# Patient Record
Sex: FEMALE
[De-identification: names, ages, dates, MRNs, and addresses within clinical notes are randomized per-mention and may not be internally consistent; named-entity substitution may affect disease eponyms.]

---
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
## 2025-01-08 NOTE — PROGRESS NOTES
PATIENT PROGRESS NOTE  OTOLOGY/OTOLARYNGOLOGY    REF MD:  No referring provider defined for this encounter.     PCP: Galilea Mcduffie    CHIEF COMPLAINT:    No chief complaint on file.    LAST VISIT 10/07/2024  IMPRESSION:  Right conductive hearing loss - mild, stable with unchanged audiogram  S/P Right tympanoplasty, Right atticotomy, Cartilage graft, OCR (partial) 3/8/2022  S/p Right tympanomastoidectomy, OCR (partial) revision 11/7/2022     PLAN:  -CT Temporal Bones reviewed  -Continue Ciprodex otic drops for 2 more weeks  -Continue water precautions  -May return to normal activity after 1 week  -No flying for 4 weeks post-op  -Follow-up in 2 weeks for gel foam packing removal  ____________________________________________________________________________  Interval history: S/p. Right revision tympanomastoidectomy  2. Right Ossicular chain reconstruction with titanium prosthesis  3. Right conchal cartilage graft   4. Facial nerve monitoring  On 12/17/2024    Here for packing removal. No issues since surgery.     Per Alejandro note:   INTERVAL HX: No issues since surgery. Denies dizziness. Denies change in taste. Hearing feels stable. Denies otorrhea.     HISTORY OF PRESENT ILLNESS: Nahed Ulrich is a 16y female who presents for evaluation of cholesteatoma. Patient is referred by Dr. Naun Dewitt. Patient is accompanied by her mother. Patient had a history of ear infections requiring antibiotics, but did not need ear tubes as a child. In the last year has had drainage from the ear, decreased hearing, and some episodes of imbalance/headache. When she was seen by Dr. Dewitt she was found to have a growth in the right ear which pathology returned as cholesteatoma. After a course of oral antibiotics and ciprodex drops her drainage has stopped and hearing is improved. Patient underwent CT IAC. Has not yet had hearing test. Denies tinnitus. Patient has no other medical conditions.     PAST MEDICAL HISTORY:  No past medical history on  file.    PAST SURGICAL HISTORY:    Surgical history:   Right ear cholesteatoma   S/P Right tympanoplasty, Right atticotomy, Cartilage graft, OCR (partial) 3/8/2022  S/p Right tympanomastoidectomy, OCR (partial) revision 11/7/2022   S/P Right revision tympanomastoidectomy, Right Ossicular chain reconstruction with titanium prosthesis, Right conchal cartilage graft, Facial nerve monitoring 12/17/2024    Current Outpatient Medications on File Prior to Visit   Medication Sig Dispense Refill    HYDROcodone-acetaminophen (NORCO) 5-325 MG Oral Tab Take 1 tablet by mouth every 6 (six) hours as needed for Pain (severe pain). 5 tablet 0    Multiple Vitamins-Minerals (MULTI FOR HER) Oral Tab Take by mouth.       No current facility-administered medications on file prior to visit.       Allergies: No Known Allergies    SOCIAL HISTORY:    Social History     Tobacco Use    Smoking status: Never    Smokeless tobacco: Never   Substance Use Topics    Alcohol use: Never       FAMILY HISTORY: Denies known family history of hearing loss, tinnitus, vertigo, or migraine.  Denies known family history of head and neck cancer, thyroid cancer, bleeding disorders.     REVIEW OF SYSTEMS:   Positives are in bold  Neuro: Headache, facial weakness, facial numbness, neck pain, vertigo  ENT: Hearing change, tinnitus, otorrhea, otalgia, aural fullness, ear pressure, vertigo, imbalance  Sinus pressure, rhinorrhea, congestion, facial pain, jaw pain, dysphagia, odynophagia, sore throat, voice changes, shortness of breath    EXAMINATION:  I washed my hands with an alcohol-based hand gel prior to examination  Constitutional:   --Vitals: /76  Pulse 74  Ht 1.605 m (5' 3.19\")  Wt 67.1 kg (148 lb)  LMP 05/23/2023 (Exact Date)  BMI 26.06 kg/m²   --General: no apparent distress, well-developed, conversant  Psych: affect pleasant and appropriate for age, alert and oriented  Neuro: Facial movement normal bilateral  Respiratory: No stridor, stertor or  increased work of breathing  ENT:  --Ear: (bilateral ears examined under binocular microscopy)  Right ear microscopic exam:  Pinna: Normal, no lesions or masses. Postauricular well healed. A few sutures were extruded, and were removed.   Mastoid: Nontender on palpation.   External auditory canal: Filled with gel foam packing - removed. no evidence of infection  Tympanic membrane: Intact with cartilage graft in place, no tympanic membrane perforation, retraction, or defect present.  Middle ear: Opacified.     Left ear microscopic exam:  Pinna: Normal, no lesions or masses.  Mastoid: Nontender on palpation.   External auditory canal: Clear, no masses or lesions.  Tympanic membrane: Intact, no lesions, normal landmarks.  Middle ear: Aerated.    Audiogram (date 6/13/2022) interpreted and reviewed with the patient today showing:  Puretones: Right: mild CHL Left: normal  SRT: Right: 25 Left: 5  SD: Right: 96 Left: DNT    Audiogram (date 02/16/22) interpreted and reviewed with the patient today showing:  Puretones: Right: within normal range, but there is a ABG Left: normal (better PTA on left than right)  SRT: Right: 25 Left: 5  SD: Right: 96 Left: 100    Audiogram (date 1/9/2023) interpreted and reviewed with the patient today showing:  Puretones: Right: mild CHL to normal to moderate CHL Left: normal  SRT: Right: 25 Left: 5  SD: Right: 100 Left: 96    Surgical pathology 01/27/2022  FINAL DIAGNOSIS   RIGHT EAR DRUM, BIOPSY:   -FRAGMENTS OF KERATINACEOUS DEBRIS CONSISTENT WITH CHOLESTEATOMA     CT TEMPORAL BONES - 10/21/2024  Impression   CONCLUSION:     1. RIGHT temporal bone:  Postoperative changes of tympanomastoidectomy, atticotomy, tympanoplasty, and partial ossicular chain reconstruction for cholesteatoma resection are identified.  Please note that there is no available comparison imaging at this  institution.  Nonspecific 0.9 cm lobulated soft tissue density focus in the epitympanum, which could relate to  postprocedural granulation tissue versus residual/recurrent cholesteatoma.  There is also partial opacification of the mastoidectomy bowl,  which is favored to represent postprocedural granulation tissue.  Correlation any available preoperative imaging would helpful.  If unavailable, consider further evaluation with an internal auditory canal protocol MRI with propeller diffusion to assess  for potential cholesteatoma.  Trace debris or cerumen in the external auditory canal.  Borderline high position of the jugular bulb.     2. LEFT temporal bone:  Trace debris or cerumen in the external auditory canal.  Mastoid and middle ear cavities are well aerated.  No destructive/erosive osseous changes.     3. Mild-to-moderate adenoid enlargement.  4. Lesser incidental findings as above.            ASSESSMENT/PLAN:  Nahed Ulrich is a 19 year old female with   No diagnosis found.       IMPRESSION:  Right middle ear/mastoid cholesteatoma  Right conductive hearing loss - mild  S/P Right tympanoplasty, Right atticotomy, Cartilage graft, OCR (partial) 3/8/2022  S/p Right tympanomastoidectomy, OCR (partial) revision 11/7/2022   S/P Right revision tympanomastoidectomy, Right Ossicular chain reconstruction with titanium prosthesis, Right conchal cartilage graft, Facial nerve monitoring 12/17/2024    PLAN:  -Continue Ciprodex otic drops for 2 more days  -Maintain water precautions for 1 more week  -May return to normal activities after 1 week  -No flying for 1 more week   -Follow-up in 6 weeks for recheck and an audiogram at that time    Situation reviewed with the patient in detail.    Attention: This note has been scribed by Alana Regalado under the supervision of Clifford Lyn MD.     Clifford Lyn MD  Otology/Otolaryngology  [facility]   [address] [facility] [address] [place] [id number]  Phone [phone]  Fax [phone]      I have personally performed the services described in  this documentation. All medical record entries made by the scribe were at my direction and in my presence. I have reviewed the chart and agree that the medical record reflects my personal performance and is accurate and complete.

## 2025-01-09 ENCOUNTER — OFFICE VISIT (OUTPATIENT)
Facility: LOCATION | Age: 20
End: 2025-01-09

## 2025-01-09 VITALS — WEIGHT: 152 LBS | HEIGHT: 63 IN | BODY MASS INDEX: 26.93 KG/M2

## 2025-01-09 DIAGNOSIS — H90.11 CONDUCTIVE HEARING LOSS OF RIGHT EAR WITH UNRESTRICTED HEARING OF LEFT EAR: Primary | ICD-10-CM

## 2025-01-09 DIAGNOSIS — H71.01 CHOLESTEATOMA OF ATTIC OF EAR, RIGHT: ICD-10-CM

## 2025-01-09 PROCEDURE — 99024 POSTOP FOLLOW-UP VISIT: CPT | Performed by: OTOLARYNGOLOGY

## 2025-03-05 NOTE — PROGRESS NOTES
PATIENT PROGRESS NOTE  OTOLOGY/OTOLARYNGOLOGY    REF MD:  No referring provider defined for this encounter.     PCP: Galilea Mcduffie    CHIEF COMPLAINT:    No chief complaint on file.    LAST VISIT 1/09/2025  IMPRESSION:  Right middle ear/mastoid cholesteatoma  Right conductive hearing loss - mild  S/P Right tympanoplasty, Right atticotomy, Cartilage graft, OCR (partial) 3/8/2022  S/p Right tympanomastoidectomy, OCR (partial) revision 11/7/2022   S/P Right revision tympanomastoidectomy, Right Ossicular chain reconstruction with titanium prosthesis, Right conchal cartilage graft, Facial nerve monitoring 12/17/2024    PLAN:  -Continue Ciprodex otic drops for 2 more days  -Maintain water precautions for 1 more week  -May return to normal activities after 1 week  -No flying for 1 more week   -Follow-up in 6 weeks for recheck and an audiogram at that time  ____________________________________________________________________________  Interval history: S/p. Right revision tympanomastoidectomy, Right Ossicular chain reconstruction with titanium prosthesis, Right conchal cartilage graft , Facial nerve monitoring - 12/17/2024. Here for post-op evaluation. Patient is doing well.     Per Alejandro note:   INTERVAL HX: No issues since surgery. Denies dizziness. Denies change in taste. Hearing feels stable. Denies otorrhea.     HISTORY OF PRESENT ILLNESS: Nahed Ulrich is a 16y female who presents for evaluation of cholesteatoma. Patient is referred by Dr. Naun Dewitt. Patient is accompanied by her mother. Patient had a history of ear infections requiring antibiotics, but did not need ear tubes as a child. In the last year has had drainage from the ear, decreased hearing, and some episodes of imbalance/headache. When she was seen by Dr. Dewitt she was found to have a growth in the right ear which pathology returned as cholesteatoma. After a course of oral antibiotics and ciprodex drops her drainage has stopped and hearing is improved.  Patient underwent CT IAC. Has not yet had hearing test. Denies tinnitus. Patient has no other medical conditions.     PAST MEDICAL HISTORY:  No past medical history on file.    PAST SURGICAL HISTORY:    Surgical history:   Right ear cholesteatoma   S/P Right tympanoplasty, Right atticotomy, Cartilage graft, OCR (partial) 3/8/2022  S/p Right tympanomastoidectomy, OCR (partial) revision 11/7/2022   S/P Right revision tympanomastoidectomy, Right Ossicular chain reconstruction with titanium prosthesis, Right conchal cartilage graft, Facial nerve monitoring 12/17/2024    Current Outpatient Medications on File Prior to Visit   Medication Sig Dispense Refill    HYDROcodone-acetaminophen (NORCO) 5-325 MG Oral Tab Take 1 tablet by mouth every 6 (six) hours as needed for Pain (severe pain). (Patient not taking: Reported on 1/9/2025) 5 tablet 0    Multiple Vitamins-Minerals (MULTI FOR HER) Oral Tab Take by mouth.       No current facility-administered medications on file prior to visit.       Allergies: No Known Allergies    SOCIAL HISTORY:    Social History     Tobacco Use    Smoking status: Never    Smokeless tobacco: Never   Substance Use Topics    Alcohol use: Never       FAMILY HISTORY: Denies known family history of hearing loss, tinnitus, vertigo, or migraine.  Denies known family history of head and neck cancer, thyroid cancer, bleeding disorders.     REVIEW OF SYSTEMS:   Positives are in bold  Neuro: Headache, facial weakness, facial numbness, neck pain, vertigo  ENT: Hearing change, tinnitus, otorrhea, otalgia, aural fullness, ear pressure, vertigo, imbalance  Sinus pressure, rhinorrhea, congestion, facial pain, jaw pain, dysphagia, odynophagia, sore throat, voice changes, shortness of breath    EXAMINATION:  I washed my hands with an alcohol-based hand gel prior to examination  Constitutional:   --Vitals: /76  Pulse 74  Ht 1.605 m (5' 3.19\")  Wt 67.1 kg (148 lb)  LMP 05/23/2023 (Exact Date)  BMI 26.06  kg/m²   --General: no apparent distress, well-developed, conversant  Psych: affect pleasant and appropriate for age, alert and oriented  Neuro: Facial movement normal bilateral  Respiratory: No stridor, stertor or increased work of breathing  ENT:  --Ear: (bilateral ears examined under binocular microscopy)  Right ear microscopic exam:  Pinna: Normal, no lesions or masses. Postauricular well healed. A few sutures were extruded, and were removed.   Mastoid: Nontender on palpation.   External auditory canal: no evidence of infection  Tympanic membrane: Intact with cartilage graft in place, no tympanic membrane perforation, retraction, or defect present.  Middle ear: Aerated.     Left ear microscopic exam:  Pinna: Normal, no lesions or masses.  Mastoid: Nontender on palpation.   External auditory canal: Clear, no masses or lesions.  Tympanic membrane: Intact, no lesions, normal landmarks.  Middle ear: Aerated.    Audiogram (date 6/13/2022) interpreted and reviewed with the patient today showing:  Puretones: Right: mild CHL Left: normal  SRT: Right: 25 Left: 5  SD: Right: 96 Left: DNT    Audiogram (date 02/16/22) interpreted and reviewed with the patient today showing:  Puretones: Right: within normal range, but there is a ABG Left: normal (better PTA on left than right)  SRT: Right: 25 Left: 5  SD: Right: 96 Left: 100    Audiogram (date 1/9/2023) interpreted and reviewed with the patient today showing:  Puretones: Right: mild CHL to normal to moderate CHL Left: normal  SRT: Right: 25 Left: 5  SD: Right: 100 Left: 96    Surgical pathology 01/27/2022  FINAL DIAGNOSIS   RIGHT EAR DRUM, BIOPSY:   -FRAGMENTS OF KERATINACEOUS DEBRIS CONSISTENT WITH CHOLESTEATOMA     CT TEMPORAL BONES - 10/21/2024  Impression   CONCLUSION:     1. RIGHT temporal bone:  Postoperative changes of tympanomastoidectomy, atticotomy, tympanoplasty, and partial ossicular chain reconstruction for cholesteatoma resection are identified.  Please note  that there is no available comparison imaging at this  institution.  Nonspecific 0.9 cm lobulated soft tissue density focus in the epitympanum, which could relate to postprocedural granulation tissue versus residual/recurrent cholesteatoma.  There is also partial opacification of the mastoidectomy bowl,  which is favored to represent postprocedural granulation tissue.  Correlation any available preoperative imaging would helpful.  If unavailable, consider further evaluation with an internal auditory canal protocol MRI with propeller diffusion to assess  for potential cholesteatoma.  Trace debris or cerumen in the external auditory canal.  Borderline high position of the jugular bulb.     2. LEFT temporal bone:  Trace debris or cerumen in the external auditory canal.  Mastoid and middle ear cavities are well aerated.  No destructive/erosive osseous changes.     3. Mild-to-moderate adenoid enlargement.  4. Lesser incidental findings as above.            ASSESSMENT/PLAN:  Nahed Ulrich is a 19 year old female with   No diagnosis found.       IMPRESSION:  Right middle ear/mastoid cholesteatoma  Right conductive hearing loss - hearing improved with persistent mild CHL   S/P Right tympanoplasty, Right atticotomy, Cartilage graft, OCR (partial) 3/8/2022  S/p Right tympanomastoidectomy, OCR (partial) revision 11/7/2022   S/P Right revision tympanomastoidectomy, Right Ossicular chain reconstruction with titanium prosthesis, Right conchal cartilage graft, Facial nerve monitoring 12/17/2024    PLAN:  -Audiogram reviewed with patient   -Okay to wet ear  -Okay to fly  -Okay to resume normal activities  -Consider Eustachian tube dilation at the next visit if tympanic membrane retraction is observed  -Follow-up in 3 months     Situation reviewed with the patient in detail.    Attention: This note has been scribed by Alana Regalado under the supervision of Clifford Lyn MD.     Clifford Lyn MD   Otology/Otolaryngology  [facility] [address]  [place] [address]  Phone [phone]  Fax [phone]      I have personally performed the services described in this documentation. All medical record entries made by the scribe were at my direction and in my presence. I have reviewed the chart and agree that the medical record reflects my personal performance and is accurate and complete.

## 2025-03-06 ENCOUNTER — OFFICE VISIT (OUTPATIENT)
Facility: LOCATION | Age: 20
End: 2025-03-06

## 2025-03-06 ENCOUNTER — OFFICE VISIT (OUTPATIENT)
Facility: LOCATION | Age: 20
End: 2025-03-06
Payer: COMMERCIAL

## 2025-03-06 DIAGNOSIS — H90.11 CONDUCTIVE HEARING LOSS OF RIGHT EAR WITH UNRESTRICTED HEARING OF LEFT EAR: Primary | ICD-10-CM

## 2025-03-06 DIAGNOSIS — H71.21 CHOLESTEATOMA OF RIGHT MASTOID: ICD-10-CM

## 2025-03-06 PROCEDURE — 99024 POSTOP FOLLOW-UP VISIT: CPT | Performed by: OTOLARYNGOLOGY

## 2025-03-06 PROCEDURE — 92557 COMPREHENSIVE HEARING TEST: CPT | Performed by: AUDIOLOGIST

## 2025-04-16 ENCOUNTER — TELEPHONE (OUTPATIENT)
Dept: OTOLARYNGOLOGY | Facility: CLINIC | Age: 20
End: 2025-04-16

## 2025-04-16 ENCOUNTER — OFFICE VISIT (OUTPATIENT)
Dept: OTOLARYNGOLOGY | Facility: CLINIC | Age: 20
End: 2025-04-16
Payer: COMMERCIAL

## 2025-04-16 DIAGNOSIS — J32.0 LEFT MAXILLARY SINUSITIS: Primary | ICD-10-CM

## 2025-04-16 PROCEDURE — 99213 OFFICE O/P EST LOW 20 MIN: CPT | Performed by: OTOLARYNGOLOGY

## 2025-04-16 NOTE — TELEPHONE ENCOUNTER
please see note below, tried calling patient to get more information regarding symptoms. Per pt mother pt started having ear pain and cold symptoms last Friday, fever. Pt took tylenol and mucinex. She states pt c/o of pain, pressure in left ear, denies drainage or other symptoms. Mother is concerned due to pt history and having surgery just in December. She is asking if pt can be seen today after 3:30pm or anytime tomorrow. Please advise

## 2025-04-16 NOTE — PROGRESS NOTES
The following individual(s) verbally consented to be recorded using ambient AI listening technology and understand that they can each withdraw their consent to this listening technology at any point by asking the clinician to turn off or pause the recording:    Patient name: Nahed Faustin Mojgan  Additional names:  CANDELARIOJENYHIPOLITO MOJGAN- MOTHER

## 2025-04-16 NOTE — TELEPHONE ENCOUNTER
Per mom patient has pain in her ear and has history of 3 surgeries. Per mom asking if patient can be seen today or tomorrow. Please advise

## 2025-05-01 NOTE — PROGRESS NOTES
PATIENT PROGRESS NOTE  OTOLOGY/OTOLARYNGOLOGY    REF MD:  No referring provider defined for this encounter.     PCP: Galilea Mcduffie    CHIEF COMPLAINT:    Chief Complaint   Patient presents with    Follow - Up     C/o runny nose and cough with fatigue, left ear pressure.      History of Present Illness  Nahed Ulrich is a 20-year-old female who presents with ear discomfort and sinus congestion.    She began experiencing ear discomfort this morning in the non-surgical ear, described as pressure, which started on Friday and has not resolved.    Since Friday, she has experienced symptoms consistent with a viral infection, worsening by Sunday, including congestion, an initially runny nose, and a productive cough. The cough began due to throat irritation and dryness but has now become productive. No fever, facial pressure, or pain is reported, but she has a headache attributed to coughing.    She feels her hearing is 'a little muffled' but does not believe a hearing test is necessary as there is no fluid present.    She has not taken any over-the-counter decongestants like Sudafed before.      PAST MEDICAL HISTORY:  History reviewed. No pertinent past medical history.    PAST SURGICAL HISTORY:    Surgical history:   Right ear cholesteatoma   S/P Right tympanoplasty, Right atticotomy, Cartilage graft, OCR (partial) 3/8/2022  S/p Right tympanomastoidectomy, OCR (partial) revision 11/7/2022   S/P Right revision tympanomastoidectomy, Right Ossicular chain reconstruction with titanium prosthesis, Right conchal cartilage graft, Facial nerve monitoring 12/17/2024    Current Outpatient Medications on File Prior to Visit   Medication Sig Dispense Refill    Multiple Vitamins-Minerals (MULTI FOR HER) Oral Tab Take by mouth.      HYDROcodone-acetaminophen (NORCO) 5-325 MG Oral Tab Take 1 tablet by mouth every 6 (six) hours as needed for Pain (severe pain). (Patient not taking: Reported on 4/16/2025) 5 tablet 0     No current  facility-administered medications on file prior to visit.       Allergies: No Known Allergies    SOCIAL HISTORY:    Social History     Tobacco Use    Smoking status: Never    Smokeless tobacco: Never   Substance Use Topics    Alcohol use: Never       Family History[1]    REVIEW OF SYSTEMS:   PER HPI    EXAMINATION:  I washed my hands with an alcohol-based hand gel prior to examination  Constitutional:   --Vitals: /76  Pulse 74  Ht 1.605 m (5' 3.19\")  Wt 67.1 kg (148 lb)  LMP 05/23/2023 (Exact Date)  BMI 26.06 kg/m²   --General: no apparent distress, well-developed, conversant  Psych: affect pleasant and appropriate for age, alert and oriented  Neuro: Facial movement normal bilateral  Respiratory: No stridor, stertor or increased work of breathing  ENT:  --Nose: purulence draining from the left middle meatus noted, mucosa edema, septum midline   --Ear: (bilateral ears examined under binocular microscopy)  Right ear microscopic exam:  Pinna: Normal, no lesions or masses. Postauricular well healed. A few sutures were extruded, and were removed.   Mastoid: Nontender on palpation.   External auditory canal: no evidence of infection  Tympanic membrane: Intact with cartilage graft in place, no tympanic membrane perforation, retraction, or defect present.  Middle ear: Aerated.     Left ear microscopic exam:  Pinna: Normal, no lesions or masses.  Mastoid: Nontender on palpation.   External auditory canal: Clear, no masses or lesions.  Tympanic membrane: Intact, no lesions, normal landmarks.  Middle ear: Aerated.      Latest Audiogram Result (Hz) Exam performed: 3/6/2025 1:06 PM Last edited by Mandy Cerda, AUD on 3/6/2025 1:16 PM        125 250  1500 2000 3000 4000 6000 8000    Right air:  25 30  20 10 0 15 25 40 40    Left air:  10 5  5  0  5  0    Right mastoid bone (masked):   10  5  5  0         Reliability:  Good    Transducer:  Headphones, Bone Oscillator    Technique:  Conventional  Audiometry    Comments:            Latest Speech Audiometry  Last edited by Mandy Cerda AUD on 3/6/2025 1:16 PM       Ear Method PTA SAT SRT [facility] Test/list Score (%) Intensity Mask/noise Notes    right live voice   10   10 By Difficulty 100 55      left live voice   5   10 By Difficulty 100 55                    Latest Audiogram Result Text  Last edited by Mandy Cerda AUD on 3/6/2025  2:52 PM      Addendum      HISTORY:    Nahed Ulrich, 20 year old, was seen for a hearing assessment during an office visit with otolaryngologist Clifford Lyn MD for follow-up of right revision tympanomastoidectomy, cartilage graft, and ossicular chain reconstruction.   See ENT ROS intake form for detailed history.    RESULTS:    Pure tone air and bone conduction thresholds were consistent with mild conductive hearing loss through 1000 Hz, rising to normal hearing sensitivity 6241-5504 Hz, sloping to mild conductive hearing loss 1975-7908 Hz in the right ear and normal hearing sensitivity in the left ear.     SRT:  Right: 10 dB HL            Left :  5 dB HL    WRS:  Right: 100% at 55 dB HL             Left: 100% at 55 dB HL    RECOMMENDATIONS:  1.  Follow-up with Clifford Lyn MD.   2.  Audiological monitoring as needed during the course of medical management.       Addended by Mandy Cerda AUD on 3/6/2025  2:52 PM             CT TEMPORAL BONES - 10/21/2024  Impression   CONCLUSION:     1. RIGHT temporal bone:  Postoperative changes of tympanomastoidectomy, atticotomy, tympanoplasty, and partial ossicular chain reconstruction for cholesteatoma resection are identified.  Please note that there is no available comparison imaging at this  institution.  Nonspecific 0.9 cm lobulated soft tissue density focus in the epitympanum, which could relate to postprocedural granulation tissue versus residual/recurrent cholesteatoma.  There is also partial opacification of the mastoidectomy  bowl,  which is favored to represent postprocedural granulation tissue.  Correlation any available preoperative imaging would helpful.  If unavailable, consider further evaluation with an internal auditory canal protocol MRI with propeller diffusion to assess  for potential cholesteatoma.  Trace debris or cerumen in the external auditory canal.  Borderline high position of the jugular bulb.     2. LEFT temporal bone:  Trace debris or cerumen in the external auditory canal.  Mastoid and middle ear cavities are well aerated.  No destructive/erosive osseous changes.     3. Mild-to-moderate adenoid enlargement.  4. Lesser incidental findings as above.       ASSESSMENT/PLAN:  Nahed Ulrich is a 20 year old female with     ICD-10-CM   1. Left maxillary sinusitis  J32.0        IMPRESSION:  Right middle ear/mastoid cholesteatoma  Right conductive hearing loss - hearing improved with persistent mild CHL   S/P Right tympanoplasty, Right atticotomy, Cartilage graft, OCR (partial) 3/8/2022  S/p Right tympanomastoidectomy, OCR (partial) revision 11/7/2022   S/P Right revision tympanomastoidectomy, Right Ossicular chain reconstruction with titanium prosthesis, Right conchal cartilage graft, Facial nerve monitoring 12/17/2024    Assessment & Plan  Sinusitis  Symptoms and examination suggest secondary bacterial infection.  - Prescribe Augmentin for 10 days.  - Advise taking Augmentin with food, yogurt, and probiotics.  - Recommend over-the-counter cough suppressants like Robitussin or Delsym.  - Suggest nasal decongestants with pseudoephedrine, 12-hour formulation.  - Advise using a humidifier and saline nasal rinses.  - Encourage rest.  - Instruct to return if symptoms do not improve.    Right ear pressure  Likely referred pain from sinusitis. Ear examination normal post-surgery.  - Monitor symptoms and return if hearing does not improve or other concerns arise.    Viral infection  Symptoms consistent with viral upper  respiratory infection.  - Consider over-the-counter flu testing if symptoms persist or worsen.  - Encourage rest and symptomatic treatment.    Follow-up  Advised follow-up based on symptom resolution and concerns.  - Schedule follow-up in June if symptoms resolve, or sooner if symptoms persist or worsen.      Situation reviewed with the patient in detail.    Clifford Lyn MD  Otology/Otolaryngology  [address] [place] [address]  Phone [phone]  Fax [phone]           [1] History reviewed. No pertinent family history.     98.1

## 2025-06-12 ENCOUNTER — OFFICE VISIT (OUTPATIENT)
Facility: LOCATION | Age: 20
End: 2025-06-12
Payer: COMMERCIAL

## 2025-06-12 DIAGNOSIS — H90.11 CONDUCTIVE HEARING LOSS OF RIGHT EAR WITH UNRESTRICTED HEARING OF LEFT EAR: Primary | ICD-10-CM

## 2025-06-12 PROCEDURE — 99213 OFFICE O/P EST LOW 20 MIN: CPT | Performed by: OTOLARYNGOLOGY

## 2025-06-12 NOTE — PROGRESS NOTES
The following individual(s) verbally consented to be recorded using ambient AI listening technology and understand that they can each withdraw their consent to this listening technology at any point by asking the clinician to turn off or pause the recording: Patient says yes to consent    Patient name: Nahed Faustin Mojgan  Additional names: Dickson Ulrich

## 2025-06-23 NOTE — PROGRESS NOTES
PATIENT PROGRESS NOTE  OTOLOGY/OTOLARYNGOLOGY    REF MD:  No referring provider defined for this encounter.     PCP: Galilea Mcduffie    CHIEF COMPLAINT:    Chief Complaint   Patient presents with    Post-Op     Pst-op removal of left cholesteatoma      History of Present Illness    Nahed Ulrich is a 20 year old female who presents for a follow-up after ear surgery and recent sinus infection.    She feels better from the sinus infection, although it took a while to resolve. She experienced side effects from the antibiotics, including cognitive difficulties described as 'brain fog' and stomach pain, but these have subsided. No current ear pressure and her hearing has improved.    She has a history of ear surgery, with the last one being approximately six months ago. She mentions feeling weaker after multiple surgeries and notes that any pain or injury tends to occur on one side of her body. She experiences shoulder pain and has noticed hair loss on the same side, although the hair loss has improved.    She has not been exercising routinely since her surgeries. Before her surgeries, she was active in tennis and had a regular walking schedule. She wants to return to physical activities like tennis to regain her strength.    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 She is currently on summer break.      PAST MEDICAL HISTORY:  History reviewed. No pertinent past medical history.    PAST SURGICAL HISTORY:    Surgical history:   Right ear cholesteatoma   S/P Right tympanoplasty, Right atticotomy, Cartilage graft, OCR (partial) 3/8/2022  S/p Right tympanomastoidectomy, OCR (partial) revision 11/7/2022   S/P Right revision tympanomastoidectomy, Right Ossicular chain reconstruction with titanium prosthesis, Right conchal cartilage graft, Facial nerve monitoring 12/17/2024    Current Outpatient Medications on File Prior to Visit   Medication Sig Dispense Refill    Multiple  Vitamins-Minerals (MULTI FOR HER) Oral Tab Take by mouth.       No current facility-administered medications on file prior to visit.       Allergies: No Known Allergies    SOCIAL HISTORY:    Social History     Tobacco Use    Smoking status: Never    Smokeless tobacco: Never   Substance Use Topics    Alcohol use: Never       Family History[1]    REVIEW OF SYSTEMS:   PER HPI    EXAMINATION:  I washed my hands with an alcohol-based hand gel prior to examination  Constitutional:   --Vitals: /76  Pulse 74  Ht 1.605 m (5' 3.19\")  Wt 67.1 kg (148 lb)  LMP 05/23/2023 (Exact Date)  BMI 26.06 kg/m²   --General: no apparent distress, well-developed, conversant  Neuro: Facial movement normal bilateral  Respiratory: No stridor, stertor or increased work of breathing  ENT:  --Nose: septum midline, no ITH, normal mucosa  --Ear: (bilateral ears examined under binocular microscopy)  Right ear microscopic exam:  Pinna: Normal, no lesions or masses. Postauricular well healed.   Mastoid: Nontender on palpation.   External auditory canal: no evidence of infection  Tympanic membrane: Intact with cartilage graft in place, no tympanic membrane perforation, retraction, or defect present.  Middle ear: Aerated.     Left ear microscopic exam:  Pinna: Normal, no lesions or masses.  Mastoid: Nontender on palpation.   External auditory canal: Clear, no masses or lesions.  Tympanic membrane: Intact, no lesions, normal landmarks.  Middle ear: Aerated.      Latest Audiogram Result (Hz) Exam performed: 3/6/2025 1:06 PM Last edited by Mandy Cerda, AUD on 3/6/2025 1:16 PM        125 250  1500 2000 3000 4000 6000 8000    Right air:  25 30  20 10 0 15 25 40 40    Left air:  10 5  5  0  5  0    Right mastoid bone (masked):   10  5  5  0         Reliability:  Good    Transducer:  Headphones, Bone Oscillator    Technique:  Conventional Audiometry    Comments:            Latest Speech Audiometry  Last edited by Zaida  COLE El on 3/6/2025 1:16 PM       Ear Method PTA SAT SRT [facility] Test/list Score (%) Intensity Mask/noise Notes    right live voice   10   10 By Difficulty 100 55      left live voice   5   10 By Difficulty 100 55                    Latest Audiogram Result Text  Last edited by Mandy Cerda AUD on 3/6/2025  2:52 PM      Addendum      HISTORY:    Nahed Ulrich, 20 year old, was seen for a hearing assessment during an office visit with otolaryngologist Clifford Lyn MD for follow-up of right revision tympanomastoidectomy, cartilage graft, and ossicular chain reconstruction.   See ENT ROS intake form for detailed history.    RESULTS:    Pure tone air and bone conduction thresholds were consistent with mild conductive hearing loss through 1000 Hz, rising to normal hearing sensitivity 4723-9831 Hz, sloping to mild conductive hearing loss 3568-2767 Hz in the right ear and normal hearing sensitivity in the left ear.     SRT:  Right: 10 dB HL            Left :  5 dB HL    WRS:  Right: 100% at 55 dB HL             Left: 100% at 55 dB HL    RECOMMENDATIONS:  1.  Follow-up with Clifford Lyn MD.   2.  Audiological monitoring as needed during the course of medical management.       Addended by Mandy Cerda AUD on 3/6/2025  2:52 PM             CT TEMPORAL BONES - 10/21/2024  Impression   CONCLUSION:     1. RIGHT temporal bone:  Postoperative changes of tympanomastoidectomy, atticotomy, tympanoplasty, and partial ossicular chain reconstruction for cholesteatoma resection are identified.  Please note that there is no available comparison imaging at this  institution.  Nonspecific 0.9 cm lobulated soft tissue density focus in the epitympanum, which could relate to postprocedural granulation tissue versus residual/recurrent cholesteatoma.  There is also partial opacification of the mastoidectomy bowl,  which is favored to represent postprocedural granulation tissue.  Correlation any  available preoperative imaging would helpful.  If unavailable, consider further evaluation with an internal auditory canal protocol MRI with propeller diffusion to assess  for potential cholesteatoma.  Trace debris or cerumen in the external auditory canal.  Borderline high position of the jugular bulb.     2. LEFT temporal bone:  Trace debris or cerumen in the external auditory canal.  Mastoid and middle ear cavities are well aerated.  No destructive/erosive osseous changes.     3. Mild-to-moderate adenoid enlargement.  4. Lesser incidental findings as above.       ASSESSMENT/PLAN:  Nahed Ulrich is a 20 year old female with     ICD-10-CM   1. Conductive hearing loss of right ear with unrestricted hearing of left ear  H90.11          IMPRESSION:  Right middle ear/mastoid cholesteatoma  Right conductive hearing loss - hearing improved with persistent mild CHL   S/P Right tympanoplasty, Right atticotomy, Cartilage graft, OCR (partial) 3/8/2022  S/p Right tympanomastoidectomy, OCR (partial) revision 11/7/2022   S/P Right revision tympanomastoidectomy, Right Ossicular chain reconstruction with titanium prosthesis, Right conchal cartilage graft, Facial nerve monitoring 12/17/2024    Assessment & Plan  Post-surgical follow-up  Improved hearing post-ear surgery, likely due to resolved sinus infection. Normal ear appearance. No current need for hearing aid or further tests unless symptoms worsen. Future imaging may differentiate scar from cholesteatoma if needed.  - Schedule follow-up in six months unless symptoms arise.  - Consider imaging in the future if clinically indicated.    Musculoskeletal weakness  Weakness and occasional shoulder pain on the side of previous surgeries. No direct surgical correlation. Advised exercise to improve strength  - Encourage regular exercise, including weight training and tennis.      Situation reviewed with the patient in detail.    Clifford Lyn MD   Otology/Otolaryngology  [address]  [place] [address]  Phone [phone]  Fax [phone]           [1] History reviewed. No pertinent family history.

## (undated) DEVICE — CONTAINER,SPECIMEN,OR STERILE,4OZ: Brand: MEDLINE

## (undated) DEVICE — ANTIBACTERIAL UNDYED BRAIDED (POLYGLACTIN 910), SYNTHETIC ABSORBABLE SUTURE: Brand: COATED VICRYL

## (undated) DEVICE — MONITORING NEUROPHYSIOLOGICAL

## (undated) DEVICE — 1 ML INSULIN SYRINGE LUER-LOCK WITH TIP CAP: Brand: MONOJECT

## (undated) DEVICE — KIT,ANTI FOG,W/SPONGE & FLUID,SOFT PACK: Brand: MEDLINE

## (undated) DEVICE — WIPE INSTR W7.3X7.3CM VISIWIPE VISITEC

## (undated) DEVICE — PAD N ADH 3X4IN COT POLY SFT PERF FLM

## (undated) DEVICE — GLOVE SUR 6 SENSICARE PI MIC PIP CRM PWD F

## (undated) DEVICE — GLOVE SUR 6.5 SENSICARE NEOPR PWD F

## (undated) DEVICE — STANDARD HYPODERMIC NEEDLE,POLYPROPYLENE HUB: Brand: MONOJECT

## (undated) DEVICE — CATH IV 20G 1IN WNG PUR

## (undated) DEVICE — CARBIDE ROUND

## (undated) DEVICE — SYRINGE ONLY,20ML LUER LOCK: Brand: MEDLINE INDUSTRIES, INC.

## (undated) DEVICE — SLIM BODY SKIN STAPLER: Brand: APPOSE ULC

## (undated) DEVICE — STERILE COTTON BALLS LARGE 5/P: Brand: MEDLINE

## (undated) DEVICE — KERLIX BANDAGE ROLL: Brand: KERLIX

## (undated) DEVICE — ZZ--DISC-SUB-495286-SUT COAT VCRL 4-0 27IN SH ABSRB UD 26MM 1/2

## (undated) DEVICE — GAUZE,SPONGE,FLUFF,6"X6.75",STRL,5/TRAY: Brand: MEDLINE

## (undated) DEVICE — SUT PLN GUT 5-0 18IN PC-1 ABSRB TAN YELLOWISH

## (undated) DEVICE — CABLE BPLR L12FT FLYING LD DISP

## (undated) DEVICE — SKIN PREP TRAY 4 COMPARTM TRAY: Brand: MEDLINE INDUSTRIES, INC.

## (undated) DEVICE — BANDAGE,GAUZE,CONFORMING,3"X75",STRL,LF: Brand: MEDLINE

## (undated) DEVICE — COTTON TAPE,PRE-CUT,2X WHITE STRANDS: Brand: UMBILICAL TAPE

## (undated) DEVICE — PACK CDS HEAD

## (undated) DEVICE — INSULATED BLADE ELECTRODE: Brand: EDGE

## (undated) DEVICE — MICRO KOVER: Brand: UNBRANDED

## (undated) DEVICE — SOLUTION IRRIG 1000ML 0.9% NACL USP BTL

## (undated) DEVICE — ADHESIVE SKIN TOP FOR WND CLSR DERMBND ADV

## (undated) DEVICE — #1020 STERI DRAPE 41MM X 41MM SMALL: Brand: STERI-DRAPE™

## (undated) DEVICE — BANDAGE,GAUZE,BULKEE II,4.5"X4.1YD,STRL: Brand: MEDLINE

## (undated) DEVICE — 3M™ STERI-STRIP™ REINFORCED ADHESIVE SKIN CLOSURES, R1547, 1/2 IN X 4 IN (12 MM X 100 MM), 6 STRIPS/ENVELOPE: Brand: 3M™ STERI-STRIP™

## (undated) NOTE — LETTER
[address], [place], [place]    Authorization for Surgical Operation and Procedure                               I hereby authorize Clifford Barba MD, my physician and his/her assistants (if applicable), which may include medical students, residents, and/or fellows, to perform the following surgical operation/ procedure and administer such anesthesia as may be determined necessary by my physician: Operation/Procedure name (s) Right tympanoplasty, atticotomy, possible cartilage graft, possible ossicular chain reconstruction, possible revision mastoidectomy, facial nerve monitoring on Nancy Roxie Ulrich   2.   I recognize that during the surgical operation/procedure, unforeseen conditions may necessitate additional or different procedures than those listed above.  I, therefore, further authorize and request that the above-named surgeon, assistants, or designees perform such procedures as are, in their judgment, necessary and desirable.    3.   My surgeon/physician has discussed prior to my surgery the potential benefits, risks and side effects of this procedure; the likelihood of achieving goals; and potential problems that might occur during recuperation.  They also discussed reasonable alternatives to the procedure, including risks, benefits, and side effects related to the alternatives and risks related to not receiving this procedure.  I have had all my questions answered and I acknowledge that no guarantee has been made as to the result that may be obtained.    4.   Should the need arise during my operation/procedure, which includes change of level of care prior to discharge, I also consent to the administration of blood and/or blood products.  Further, I understand that despite careful testing and screening of blood or blood products by collecting agencies, I may still be subject to ill effects as a result of receiving a blood transfusion and/or blood products.  The  following are some, but not all, of the potential risks that can occur: fever and allergic reactions, hemolytic reactions, transmission of diseases such as Hepatitis, AIDS and Cytomegalovirus (CMV) and fluid overload.  In the event that I wish to have an autologous transfusion of my own blood, or a directed donor transfusion, I will discuss this with my physician.  Check only if Refusing Blood or Blood Products  I understand refusal of blood or blood products as deemed necessary by my physician may have serious consequences to my condition to include possible death. I hereby assume responsibility for my refusal and release the hospital, its personnel, and my physicians from any responsibility for the consequences of my refusal.    o  Refuse   5.   I authorize the use of any specimen, organs, tissues, body parts or foreign objects that may be removed from my body during the operation/procedure for diagnosis, research or teaching purposes and their subsequent disposal by hospital authorities.  I also authorize the release of specimen test results and/or written reports to my treating physician on the hospital medical staff or other referring or consulting physicians involved in my care, at the discretion of the Pathologist or my treating physician.    6.   I consent to the photographing or videotaping of the operations or procedures to be performed, including appropriate portions of my body for medical, scientific, or educational purposes, provided my identity is not revealed by the pictures or by descriptive texts accompanying them.  If the procedure has been photographed/videotaped, the surgeon will obtain the original picture, image, videotape or CD.  The hospital will not be responsible for storage, release or maintenance of the picture, image, tape or CD.    7.   I consent to the presence of a  or observers in the operating room as deemed necessary by my physician or their designees.    8.   I  recognize that in the event my procedure results in extended X-Ray/fluoroscopy time, I may develop a skin reaction.    9. If I have a Do Not Attempt Resuscitation (DNAR) order in place, that status will be suspended while in the operating room, procedural suite, and during the recovery period unless otherwise explicitly stated by me (or a person authorized to consent on my behalf). The surgeon or my attending physician will determine when the applicable recovery period ends for purposes of reinstating the DNAR order.  10. Patients having a sterilization procedure: I understand that if the procedure is successful the results will be permanent and it will therefore be impossible for me to inseminate, conceive, or bear children.  I also understand that the procedure is intended to result in sterility, although the result has not been guaranteed.   11. I acknowledge that my physician has explained sedation/analgesia administration to me including the risk and benefits I consent to the administration of sedation/analgesia as may be necessary or desirable in the judgment of my physician.    I CERTIFY THAT I HAVE READ AND FULLY UNDERSTAND THE ABOVE CONSENT TO OPERATION and/or OTHER PROCEDURE.     ____________________________________  _________________________________        ______________________________  Signature of Patient    Signature of Responsible Person                Printed Name of Responsible Person                                      ____________________________________  _____________________________                ________________________________  Signature of Witness        Date  Time         Relationship to Patient    STATEMENT OF PHYSICIAN My signature below affirms that prior to the time of the procedure; I have explained to the patient and/or his/her legal representative, the risks and benefits involved in the proposed treatment and any reasonable alternative to the proposed treatment. I have also  explained the risks and benefits involved in refusal of the proposed treatment and alternatives to the proposed treatment and have answered the patient's questions. If I have a significant financial interest in a co-management agreement or a significant financial interest in any product or implant, or other significant relationship used in this procedure/surgery, I have disclosed this and had a discussion with my patient.     _____________________________________________________              _____________________________  (Signature of Physician)                                                                                         (Date)                                   (Time)  Patient Name: Nahed Faustin Mojgan      : 2005      Printed: 2024     Medical Record #: [id number]                                      Page 1 of 1

## (undated) NOTE — LETTER
4/16/2025          To Whom It May Concern:    Nahed Ulrich is currently under my medical care and may not return to school at this time.    Please excuse Nahed for 3 days from 04/15/25-04/17/25  She may return to school on 04/18/25.  Activity is restricted as follows: none.    If you require additional information please contact our office.        Sincerely,    Clifford Lyn MD